# Patient Record
Sex: FEMALE | Race: WHITE
[De-identification: names, ages, dates, MRNs, and addresses within clinical notes are randomized per-mention and may not be internally consistent; named-entity substitution may affect disease eponyms.]

---

## 2018-06-27 ENCOUNTER — HOSPITAL ENCOUNTER (EMERGENCY)
Dept: HOSPITAL 25 - UCCORT | Age: 23
Discharge: HOME | End: 2018-06-27
Payer: COMMERCIAL

## 2018-06-27 VITALS — SYSTOLIC BLOOD PRESSURE: 98 MMHG | DIASTOLIC BLOOD PRESSURE: 70 MMHG
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
## 2018-06-27 DIAGNOSIS — M54.6: ICD-10-CM

## 2018-06-27 DIAGNOSIS — F17.210: ICD-10-CM

## 2018-06-27 DIAGNOSIS — M25.512: Primary | ICD-10-CM
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
## 2018-06-27 DIAGNOSIS — M75.102: ICD-10-CM
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
## 2018-06-27 PROCEDURE — G0463 HOSPITAL OUTPT CLINIC VISIT: HCPCS

## 2018-06-27 PROCEDURE — 99212 OFFICE O/P EST SF 10 MIN: CPT

## 2018-06-27 NOTE — UC
Shoulder Pain HPI





- HPI Summary


HPI Summary: 





Pt c/o left shoulder pain that began ~ 6 days ago after lifting heavy pallets 

and cases at work and felt a "tear" and "pull" in left shoulder and upper back. 

pt was seen at [address] [place] [address] and had Xray of left shoulder. Pt reports that 

shoulder xray was "negative" .  Pt has not followed up with PCP or orthopedic 

provider since accident. Pt now states that pain, stiffness, tingling and 

burning pain is radiating from base of head down back of left arm to finger 

tips.  Denies additional injury.  





- History of Current Complaint


Hx Obtained From: Patient


Hx Last Menstrual Period: 06/01/18


Pregnant?: No


Onset/Duration: Gradual Onset, Lasting Days, Still Present, Worse Since - onset


Timing: Constant


Severity Initially: Moderate


Severity Currently: Moderate


Location Of Pain: Is Discrete @ - left neck, upper back and upper extremity, 

Radiates To - sabine left upper extremity


Pain Intensity: 4


Character: Dull, Aching, Spasmodic, Stiffness


Aggravating Factor(s): Movement


Alleviating Factor(s): Rest


Associated Signs And Symptoms: Positive: Numbness/Tingling


Related History: Dominant Hand Right





- Risk Factors


Non-Orthopedic Risk Factor: Negative


DVT Risk Factors: Negative


Septic Arthritis Risk Factor: Negative





<Elise Oden NP - Last Filed: 06/27/18 16:55>





<Collin Mckeon - Last Filed: 06/27/18 18:16>





- History of Current Complaint


Chief Complaint: UCUpperExtremity


Stated Complaint: LEFT ARM COMPLAINT


Time Seen by Provider: 06/27/18 16:26





- Allergies/Home Medications


Allergies/Adverse Reactions: 


 Allergies











Allergy/AdvReac Type Severity Reaction Status Date / Time


 


No Known Allergies Allergy   Verified 06/27/18 16:30











Home Medications: 


 Home Medications





Acetaminophen [Acetaminophen Extra Strength] 1,000 mg PO Q8H PRN 06/27/18 [

History Confirmed 06/27/18]











PMH/Surg Hx/FS Hx/Imm Hx


Previously Healthy: Yes





- Surgical History


Surgical History: Yes


Surgery Procedure, Year, and Place: L ear drum.  ear tubes





- Family History


Known Family History: Positive: Cardiac Disease





- Social History


Occupation: Employed Full-time


Lives: With Family


Alcohol Use: Rare


Substance Use Type: None


Smoking Status (MU): Heavy Every Day Tobacco Smoker


Type: Cigarettes


Amount Used/How Often: 1/2 ppd


Have You Smoked in the Last Year: Yes





- Immunization History


Most Recent Influenza Vaccination: has not had





<Elise Oden NP Last Filed: 06/27/18 16:55>





Review of Systems


Constitutional: Negative


Skin: Negative


Eyes: Negative


ENT: Negative


Respiratory: Negative


Cardiovascular: Negative


Gastrointestinal: Negative


Genitourinary: Negative


Motor: Decreased ROM - left shoulder


Neurovascular: Negative


Musculoskeletal: Arthralgia - left shoulder and upper back, Decreased ROM - 

left shoulder, Myalgia


Neurological: Paresthesia - left upper extremity


Psychological: Negative


Is Patient Immunocompromised?: No


All Other Systems Reviewed And Are Negative: Yes





<Elise Oden NP Last Filed: 06/27/18 16:55>





Physical Exam


Triage Information Reviewed: Yes


Appearance: Pain Distress


Vital Signs: 


 Initial Vital Signs











Temp  99 F   06/27/18 16:26


 


Pulse  74   06/27/18 16:26


 


Resp  15   06/27/18 16:26


 


BP  98/70   06/27/18 16:26


 


Pulse Ox  100   06/27/18 16:26











Vital Signs Reviewed: Yes


Eye Exam: Normal


ENT Exam: Normal


Dental Exam: Normal


Neck exam: Normal


Respiratory Exam: Normal


Cardiovascular Exam: Normal


Musculoskeletal: Positive: Strength Limited @ - left upper extremity, ROM 

Limited @ - left shoulder, Other: - negative empty can test


Neurological Exam: Normal


Psychological Exam: Normal


Skin Exam: Normal





<Elise Oden NP Last Filed: 06/27/18 16:55>


Vital Signs: 


 Initial Vital Signs











Temp  99 F   06/27/18 16:26


 


Pulse  74   06/27/18 16:26


 


Resp  15   06/27/18 16:26


 


BP  98/70   06/27/18 16:26


 


Pulse Ox  100   06/27/18 16:26














<Collin Mckeon - Last Filed: 06/27/18 18:16>





Shoulder Course/Dx





- Differential Dx/Diagnosis


Differential Diagnosis/HQI/PQRI: Rotator Cuff Injury, Sprain, Strain, Tendonitis


Provider Diagnoses: left shoulder pain.  trigger point tenderness left upper 

back/supraspinatus





<Elise Oden NP Last Filed: 06/27/18 16:55>





Discharge





- Sign-Out/Discharge


Documenting (check all that apply): Discharge/Admit/Transfer





- Billing Disposition and Condition


Condition: STABLE


Disposition: Home





<Elise Oden NP - Last Filed: 06/27/18 16:55>





- Billing Disposition and Condition


Condition: STABLE


Disposition: Home





<Collin Mckeon RANDA - Last Filed: 06/27/18 18:16>





- Discharge Plan


Condition: Stable


Disposition: HOME


Prescriptions: 


Cyclobenzaprine TAB* [Flexeril 10 MG TAB*] 10 mg PO TID PRN #15 tab


 PRN Reason: Pain


Patient Education Materials:  Trigger Point Pain (ED), Ice Pack Application (ED)

, Shoulder Pain (ED)


Referrals: 


Caleb Montoya MD [Medical Doctor] - As Soon As Possible


No Primary Care Phys,NOPCP [Primary Care Provider] - 


Additional Instructions: 


Per institutional requirements, I have reviewed the chart, however, I was not 

consulted specifically or made aware of this patient by the above midlevel 

provider.  I did not personally evaluate, interact with , or disposition  this 

patient.

## 2019-01-02 ENCOUNTER — HOSPITAL ENCOUNTER (EMERGENCY)
Dept: HOSPITAL 25 - UCCORT | Age: 24
Discharge: HOME | End: 2019-01-02
Payer: COMMERCIAL

## 2019-01-02 VITALS — SYSTOLIC BLOOD PRESSURE: 112 MMHG | DIASTOLIC BLOOD PRESSURE: 73 MMHG

## 2019-01-02 DIAGNOSIS — F17.210: ICD-10-CM

## 2019-01-02 DIAGNOSIS — Z88.0: ICD-10-CM

## 2019-01-02 DIAGNOSIS — L02.412: Primary | ICD-10-CM

## 2019-01-02 PROCEDURE — G0463 HOSPITAL OUTPT CLINIC VISIT: HCPCS

## 2019-01-02 PROCEDURE — 87641 MR-STAPH DNA AMP PROBE: CPT

## 2019-01-02 PROCEDURE — 99212 OFFICE O/P EST SF 10 MIN: CPT

## 2019-01-02 PROCEDURE — 87205 SMEAR GRAM STAIN: CPT

## 2019-01-02 PROCEDURE — 10060 I&D ABSCESS SIMPLE/SINGLE: CPT

## 2019-01-02 PROCEDURE — 87070 CULTURE OTHR SPECIMN AEROBIC: CPT

## 2019-01-02 PROCEDURE — 87640 STAPH A DNA AMP PROBE: CPT

## 2019-01-02 NOTE — UC
Skin Complaint HPI





- HPI Summary


HPI Summary: 


Pt. is a 23 y.o female who presents to  with c/o abscess to left axilla x 2 

weeks. Pt. notes chills without fever. Denies N/V. No past medical hx. Pt. 

states area has progressively become more red and painful. Moving left arm 

makes sxs worse. Nothing makes sx better. Sxs are mild in severity. 





- History of Current Complaint


Chief Complaint: UCSkin


Time Seen by Provider: 01/02/19 17:26


Stated Complaint: LEFT ARM SKIN COMPLAINT


Hx Obtained From: Patient


Hx Last Menstrual Period: 12/26/18


Pain Intensity: 5


Pain Scale Used: 0-10 Numeric





- Allergy/Home Medications


Allergies/Adverse Reactions: 


 Allergies











Allergy/AdvReac Type Severity Reaction Status Date / Time


 


Penicillins AdvReac  bloating Verified 01/02/19 16:15











Home Medications: 


 Home Medications





Amoxicillin PO (*) [Amoxicillin 500 MG CAP*] 500 mg PO BID 01/02/19 [History 

Confirmed 01/02/19]











PMH/Surg Hx/FS Hx/Imm Hx


Previously Healthy: Yes





- Surgical History


Surgical History: Yes


Surgery Procedure, Year, and Place: L ear drum.  ear tubes





- Family History


Known Family History: Positive: Cardiac Disease





- Social History


Occupation: Unemployed


Lives: With Family


Alcohol Use: Rare


Substance Use Type: None


Smoking Status (MU): Heavy Every Day Tobacco Smoker


Type: Cigarettes


Amount Used/How Often: 1/4 to 1/2 ppd


Have You Smoked in the Last Year: Yes





- Immunization History


Most Recent Influenza Vaccination: has not had





Review of Systems


All Other Systems Reviewed And Are Negative: Yes


Constitutional: Positive: Chills


Gastrointestinal: Positive: Negative


Neurovascular: Positive: Negative


Musculoskeletal: Positive: Other: - Abscess left axila


Is Patient Immunocompromised?: No





Physical Exam


Triage Information Reviewed: Yes


Appearance: Well-Appearing - Pt. sitting on exam table in NAD>


Vital Signs: 


 Initial Vital Signs











Temp  97.8 F   01/02/19 16:09


 


Pulse  84   01/02/19 16:09


 


Resp  17   01/02/19 16:09


 


BP  112/73   01/02/19 16:09


 


Pulse Ox  100   01/02/19 16:09











Vital Signs Reviewed: Yes


Eyes: Positive: Conjunctiva Clear


Neck exam: Normal


Neck: Positive: Supple


Neurological Exam: Normal


Psychological Exam: Normal


Skin: Positive: Other - Noted to the left axilla there is a roughly 2cm area of 

fluctuance with surrounding overlying erythema.





Procedures





- Incision and Drainage


  ** Left Axilla


Anesthesia: Local, Lidocaine


Instrument(s): Scalpel


Packing: Gauze - Copious amount of foul smelling purulent matter drained 





Course/Dx





- Course


Course Of Treatment: Pt .with well defined abscess to left axilla. She is 

afebrile and well appearing. Abscess was incised and drained as noted above. 

Wound culture obtained. Will start on Clindamycin. Advised pt. to return to UC 

for wound check and packing removal/change in 48 hours. To apply warm compress. 

Ibuprofen or tylenol for pain as directed. To return to UC or go to ER for 

increased redness, swelling, fever, vomiting or if concerned. Pt. understands 

and agrees with plan.





- Differential Diagnoses - Skin Complaint


Differential Diagnoses: Abscess, Cellulitis, Lymphadenitis





- Diagnoses


Provider Diagnosis: 


 Abscess








Discharge





- Sign-Out/Discharge


Documenting (check all that apply): Patient Departure


All imaging exams completed and their final reports reviewed: No Studies





- Discharge Plan


Condition: Improved


Disposition: HOME


Prescriptions: 


Clindamycin HCl 300 mg PO QID #40 capsule


Ibuprofen TAB* [Motrin TAB* 800 MG] 800 mg PO Q8H #20 tab


Patient Education Materials:  Abscess (ED)


Referrals: 


AVA Schulz [Primary Care Provider] - 


Additional Instructions: 


Return to urgent care in 48 hours for wound checking and packing change


Take antibiotic as directed


Ibuprofen for pain as directed


Apply warm compresses


Return to UC sooner or go to ER for increased redness, swelling, pain, fever, 

or if concerned








- Billing Disposition and Condition


Condition: IMPROVED


Disposition: Home

## 2019-01-04 ENCOUNTER — HOSPITAL ENCOUNTER (EMERGENCY)
Dept: HOSPITAL 25 - UCCORT | Age: 24
Discharge: HOME | End: 2019-01-04
Payer: COMMERCIAL

## 2019-01-04 VITALS — SYSTOLIC BLOOD PRESSURE: 109 MMHG | DIASTOLIC BLOOD PRESSURE: 57 MMHG

## 2019-01-04 DIAGNOSIS — F17.210: ICD-10-CM

## 2019-01-04 DIAGNOSIS — L02.412: ICD-10-CM

## 2019-01-04 DIAGNOSIS — Z88.0: ICD-10-CM

## 2019-01-04 DIAGNOSIS — Z51.89: Primary | ICD-10-CM

## 2019-01-04 PROCEDURE — 99211 OFF/OP EST MAY X REQ PHY/QHP: CPT

## 2019-01-04 PROCEDURE — G0463 HOSPITAL OUTPT CLINIC VISIT: HCPCS

## 2019-01-04 NOTE — UC
Skin Complaint HPI





- HPI Summary


HPI Summary: 





abscess left axilla x 5 days


had I&D 2 days ago and was placed on Clinda 


here for recheck and removal of the packing 


no fever, no chills, no drainage 





- History of Current Complaint


Chief Complaint: UCGeneralIllness


Time Seen by Provider: 01/04/19 11:56


Stated Complaint: RECHECK - PACKING


Hx Obtained From: Patient


Hx Last Menstrual Period: 12/26/18


Onset/Duration: Gradual Onset, Lasting Days - 5, Still Present


Timing: Constant


Onset Severity: Severe


Current Severity: Moderate


Pain Intensity: 0


Location: Discrete - right axilla


Character: Swelling, Pain, Redness, Raised, Painful


Aggravating Factor(s): Touch


Alleviating Factor(s): Nothing


Associated Signs & Symptoms: Positive: Tenderness.  Negative: Nausea, Vomiting, 

Numbness, Thirst, Diaphoresis, Weakness, Fever, Chills, Drainage





- Allergy/Home Medications


Allergies/Adverse Reactions: 


 Allergies











Allergy/AdvReac Type Severity Reaction Status Date / Time


 


Penicillins AdvReac  bloating Verified 01/04/19 11:47














PMH/Surg Hx/FS Hx/Imm Hx


Respiratory History: Asthma





- Surgical History


Surgical History: Yes


Surgery Procedure, Year, and Place: L ear drum.  ear tubes





- Family History


Known Family History: Positive: Cardiac Disease





- Social History


Alcohol Use: Rare


Substance Use Type: None


Smoking Status (MU): Heavy Every Day Tobacco Smoker


Type: Cigarettes


Amount Used/How Often: 1/4 to 1/2 ppd


Have You Smoked in the Last Year: Yes





- Immunization History


Most Recent Influenza Vaccination: has not had





Review of Systems


All Other Systems Reviewed And Are Negative: Yes


Constitutional: Positive: Negative


Skin: Positive: Other - abscess left axilla


ENT: Positive: Negative


Respiratory: Positive: Negative


Cardiovascular: Positive: Negative


Gastrointestinal: Positive: Negative


Is Patient Immunocompromised?: No





Physical Exam


Triage Information Reviewed: Yes


Appearance: Well-Appearing, No Pain Distress, Well-Nourished


Vital Signs: 


 Initial Vital Signs











Temp  98.8 F   01/04/19 11:44


 


Pulse  69   01/04/19 11:44


 


Resp  18   01/04/19 11:44


 


BP  109/57   01/04/19 11:44


 


Pulse Ox  100   01/04/19 11:44











Vital Signs Reviewed: Yes


Eye Exam: Normal


Eyes: Positive: Conjunctiva Clear


ENT: Positive: Normal ENT inspection, Hearing grossly normal, Pharynx normal


Neck: Positive: Supple, Nontender


Respiratory: Positive: Chest non-tender, Lungs clear, Normal breath sounds


Cardiovascular: Positive: RRR, No Murmur, Pulses Normal


Skin: Positive: Other - + abscess left axilla, + erytheam, swelling, tender to 

touch , no drainage, not fluctuent, no packing noted





Course/Dx





- Diagnoses


Provider Diagnosis: 


 Abscess of axilla, left








Discharge





- Sign-Out/Discharge


Documenting (check all that apply): Patient Departure


All imaging exams completed and their final reports reviewed: No Studies





- Discharge Plan


Condition: Stable


Disposition: HOME


Patient Education Materials:  Abscess (ED)


Referrals: 


No Primary Care Phys,NOPCP [Primary Care Provider] - 3 Days


Additional Instructions: 


cont. with Clinda for a total of 10 days


use moist heat compresses,


take Tylenol as needed for pain


follow up in 3 days if not better








- Billing Disposition and Condition


Condition: STABLE


Disposition: Home

## 2019-01-06 NOTE — ED
Progress





- Progress Note


Progress Note: 


Culture report from left axillary abscess: Negative for MRSA and staph aureus.


It's positive for Peptostreptococcus anaerobius and enterococcus durans


Anaerobic sensitivities are not routinely performed.


Patient is currently on clindamycin which should cover anaerobic organisms.


Please call patient to check if there is improvement.  


If her symptoms are getting worse, culture sensitivity can be requested at the 

lab for clindamycin or the antibiotic can be switched.














Course/Dx





- Diagnoses


Provider Diagnoses: 


 Abscess of axilla, left








Discharge





- Sign-Out/Discharge


Documenting (check all that apply): Post-Discharge Follow Up


All imaging exams completed and their final reports reviewed: No Studies





- Discharge Plan


Condition: Stable


Disposition: HOME


Patient Education Materials:  Abscess (ED)


Referrals: 


No Primary Care Phys,NOPCP [Primary Care Provider] - 3 Days


Additional Instructions: 


cont. with Clinda for a total of 10 days


use moist heat compresses,


take Tylenol as needed for pain


follow up in 3 days if not better








- Billing Disposition and Condition


Condition: STABLE


Disposition: Home

## 2019-01-18 ENCOUNTER — HOSPITAL ENCOUNTER (EMERGENCY)
Dept: HOSPITAL 25 - UCCORT | Age: 24
Discharge: HOME | End: 2019-01-18
Payer: COMMERCIAL

## 2019-01-18 VITALS — SYSTOLIC BLOOD PRESSURE: 124 MMHG | DIASTOLIC BLOOD PRESSURE: 66 MMHG

## 2019-01-18 DIAGNOSIS — L02.412: Primary | ICD-10-CM

## 2019-01-18 DIAGNOSIS — Z88.0: ICD-10-CM

## 2019-01-18 DIAGNOSIS — F17.210: ICD-10-CM

## 2019-01-18 DIAGNOSIS — Z51.89: ICD-10-CM

## 2019-01-18 PROCEDURE — 99212 OFFICE O/P EST SF 10 MIN: CPT

## 2019-01-18 PROCEDURE — 87205 SMEAR GRAM STAIN: CPT

## 2019-01-18 PROCEDURE — 87640 STAPH A DNA AMP PROBE: CPT

## 2019-01-18 PROCEDURE — 87641 MR-STAPH DNA AMP PROBE: CPT

## 2019-01-18 PROCEDURE — G0463 HOSPITAL OUTPT CLINIC VISIT: HCPCS

## 2019-01-18 PROCEDURE — 87070 CULTURE OTHR SPECIMN AEROBIC: CPT

## 2019-01-18 NOTE — UC
Skin Complaint HPI





- HPI Summary


HPI Summary: 





Pt with recent I+D left axilla with packing. PT states sx resolved.  x 2 days 

return of erythema and swelling. no drainage. no fevers chills  + mild TTP. Pt 

with recurrent abscesses with I+D  no analgesia taken. not immunocompromised  

no MRSA





Pt's medications reviewed this visit





- History of Current Complaint


Chief Complaint: UCSkin


Time Seen by Provider: 01/18/19 14:02


Stated Complaint: RECHECK SKIN CONCERN


Hx Obtained From: Patient, Medical Records


Hx Last Menstrual Period: 12/26


Onset/Duration: Gradual Onset


Pain Intensity: 2





- Allergy/Home Medications


Allergies/Adverse Reactions: 


 Allergies











Allergy/AdvReac Type Severity Reaction Status Date / Time


 


Penicillins AdvReac  bloating Verified 01/18/19 14:07











Home Medications: 


 Home Medications





Ibuprofen TAB* [Motrin TAB* 800 MG] 800 mg PO Q8H PRN 01/18/19 [History 

Confirmed 01/18/19]











PMH/Surg Hx/FS Hx/Imm Hx


Previously Healthy: Yes





- Surgical History


Surgical History: Yes


Surgery Procedure, Year, and Place: L ear drum.  ear tubes





- Family History


Known Family History: Positive: Cardiac Disease, Non-Contributory





- Social History


Occupation: Works From/At Home


Lives: With Family


Alcohol Use: Rare


Substance Use Type: None


Smoking Status (MU): Light Every Day Tobacco Smoker


Type: Cigarettes


Amount Used/How Often: 1/4 to 1/2 ppd


Have You Smoked in the Last Year: Yes





- Immunization History


Most Recent Influenza Vaccination: has not had





Review of Systems


All Other Systems Reviewed And Are Negative: Yes


Constitutional: Positive: Negative


Skin: Positive: Other - left axilla





Physical Exam





- Summary


Physical Exam Summary: 





Vital Signs Reviewed: Yes


A+Ox3, no distress


Eyes: Conjunctiva Clear, MAITE. EOM intact and full


ENT: Hearing grossly normal  TM x 2 clear, mmoist, uvula midline, no exudate, 

no erythema


Neck: Positive: Supple


Respiratory: Positive: No respiratory distress, No accessory muscle use + CTA 

throughout  no w/r


Cardiovascular: RRR  nl s1, s2  no m/r  CBT <2  sec


abd soft + BS nt/nd  no guarding, no distension


Musculoskeletal Exam: PAYNE x 4 without difficulty Strength Intact, ROM Intact


Neurological: Positive: Alert,  + sensation throughout


Psychological: Positive: Normal Response To Family


Skin: Positive: no rash, no ecchymosis


leftaxilla 2x2 cm area of erythema and fluctuance  with gentle pressure - wound 

drained copious purluent material  culture taken. irrigated with saline, 

dressing


Triage Information Reviewed: Yes


Vital Signs: 


 Initial Vital Signs











Temp  98.5 F   01/18/19 13:55


 


Pulse  83   01/18/19 13:55


 


Resp  20   01/18/19 13:55


 


BP  124/66   01/18/19 13:55


 


Pulse Ox  100   01/18/19 13:55














Course/Dx





- Course


Course Of Treatment: Pt with recurrent abscess pt with small abscess in left 

axilla - drained in room.  culture taken.  wound care.  Clinda.  given 

recurrent abscess in same location - will refer to general surgery for wound 

recheck.  appt Tue with Dr Kraus.  heat soaks.  wound monitor





- Diagnoses


Provider Diagnosis: 


 Abscess








Discharge





- Sign-Out/Discharge


Documenting (check all that apply): Patient Departure


All imaging exams completed and their final reports reviewed: No Studies





- Discharge Plan


Condition: Stable


Disposition: HOME


Prescriptions: 


Cephalexin CAP* [Keflex 500 CAP*] 500 mg PO TID #30 cap


Patient Education Materials:  Abscess (ED)


Referrals: 


Lester Kraus MD [Medical Doctor] -  (10:45am on Tuesday, 1/22/19)


No Primary Care Phys,NOPCP [Primary Care Provider] - 


Additional Instructions: 


- Take antibiotics EXACTLY as prescribed. these may cause diarrhea.  Eating 

yogurt or taking pro-biotics will  help with diarrhea


- Apply warm, wet soaks to your wound 2-3 times a day


- cover with antibiotic ointment (polysporin, neosporin) and bandage


- Okay to wash with warm,soapy water - pat dry completely


- you have a follow-up appointment with a surgeon on Tuesday, 1/22/19 at 10:

45am 





- Billing Disposition and Condition


Condition: STABLE


Disposition: Home